# Patient Record
Sex: MALE | Race: BLACK OR AFRICAN AMERICAN | NOT HISPANIC OR LATINO | Employment: FULL TIME | URBAN - METROPOLITAN AREA
[De-identification: names, ages, dates, MRNs, and addresses within clinical notes are randomized per-mention and may not be internally consistent; named-entity substitution may affect disease eponyms.]

---

## 2018-07-28 ENCOUNTER — OFFICE VISIT (OUTPATIENT)
Dept: OCCUPATIONAL MEDICINE | Facility: CLINIC | Age: 48
End: 2018-07-28
Payer: OTHER MISCELLANEOUS

## 2018-07-28 VITALS — HEART RATE: 94 BPM | SYSTOLIC BLOOD PRESSURE: 135 MMHG | DIASTOLIC BLOOD PRESSURE: 83 MMHG

## 2018-07-28 DIAGNOSIS — S05.90XA EYE INJURY, INITIAL ENCOUNTER: ICD-10-CM

## 2018-07-28 DIAGNOSIS — S05.02XA LEFT CORNEAL ABRASION, INITIAL ENCOUNTER: Primary | ICD-10-CM

## 2018-07-28 LAB
CTP QC/QA: YES
POC 10 PANEL DRUG SCREEN: NEGATIVE

## 2018-07-28 PROCEDURE — 80305 DRUG TEST PRSMV DIR OPT OBS: CPT | Mod: QW,S$GLB,, | Performed by: FAMILY MEDICINE

## 2018-07-28 PROCEDURE — 99203 OFFICE O/P NEW LOW 30 MIN: CPT | Mod: S$GLB,,, | Performed by: FAMILY MEDICINE

## 2018-07-28 NOTE — PROGRESS NOTES
Subjective:       Patient ID: Obinna Lundy is a 47 y.o. male.    Chief Complaint: Work Related Injury and Eye Injury    Pt states that he was at work and got something in his left eye on Tuesday. Pt states that he has some discomfort but the redness is now gone. Pt states that he flushed his eye out and just wants to have it checked out           Eye Injury    The left eye is affected. This is a new problem. The current episode started in the past 7 days. The problem has been rapidly improving. The injury mechanism was a foreign body. The pain is at a severity of 2/10. The patient is experiencing no pain. There is no known exposure to pink eye. He does not wear contacts. Pertinent negatives include no blurred vision, double vision or eye redness. He has tried commercial eye wash for the symptoms.     Review of Systems   Eyes: Negative for blurred vision, double vision, pain and redness.   All other systems reviewed and are negative.      Objective:      Physical Exam   Constitutional: He is oriented to person, place, and time. He appears well-developed.   HENT:   Head: Normocephalic.   Nose: Nose normal.   Mouth/Throat: Oropharynx is clear and moist.   Eyes: EOM are normal. Pupils are equal, round, and reactive to light. Right eye exhibits no discharge and no exudate. Left eye exhibits no discharge and no exudate. Left conjunctiva is injected (mild).   Slit lamp exam:       The left eye shows corneal abrasion (mild, healing).       Cardiovascular: Normal rate and regular rhythm.    Pulmonary/Chest: Breath sounds normal.   Abdominal: Bowel sounds are normal.   Neurological: He is alert and oriented to person, place, and time.        Visual Acuity Screening    Right eye Left eye Both eyes   Without correction:      With correction: 20/50 20/50 20/50       Assessment:       1. Left corneal abrasion, initial encounter    2. Eye injury, initial encounter        Plan:            Patient Instructions:  (apply cool  compresses)   Restrictions: Regular Duty  Follow-up if symptoms worsen or fail to improve.  Patient Instructions     I recommend taking an over-the-counter nonsteroidal anti-inflammatory drug (NSAID) (i.e. Ibuprofen 200-400mg every 8 hours or Aleve 200-250mg every 12 hours) as needed relieving pain, helping with fever, and reducing inflammation.    Return here for any worsening or failure to improve.      Corneal Injury    An eye injury can hurt your cornea. Your cornea is the clear layer on the front of your eye. It protects your eye from dust and germs, and helps filter out harmful UV (ultraviolet) rays. The cornea also helps to focus light entering your eye. Your cornea is made of strong proteins, but it can be damaged. A slight cut or scratch (abrasion) to the cornea can be very painful. But that is often minor and can heal within 1 or 2 days. A bad abrasion or a hole (puncture) in the cornea can be very serious. These are medical emergencies.  Something in your eye  If you think you have something small in your eye, flush it with water right away. Pull your upper lid out and over your bottom lid. This will help increase the flow of tears across your eye. If these methods dont work, call your healthcare provider. Never try to remove an object from your eye that doesnt flush out easily with water. Doing so may cause more damage.  When to go to the emergency room (ER)  Call 911 or your local emergency number if you have:  · Severe eye pain  · A puncture injury or bad abrasion  · Something in your eye that you cant flush out with water  · A very swollen or painful eye after removing an object  · A chemical burn  · An object embedded in your eye. (Cover both eyes with a sterile compress and keep both eyes closed while you wait for help. DO NOT put any pressure on your eyes.)  What to expect in the ER  For minor abrasions   Minor abrasions are usually treated with eye drops or ointment. You may be given  antibiotics to prevent infection. Most abrasions heal in 1 or 2 days. To help rule out more serious injuries, you may have tests including:  · A standard eye exam to check how well you can see  · A Manohar test, which uses a special dye to look for severe eye damage  Depending on the results of these tests, you may be referred to an eye specialist (ophthalmologist).  For serious abrasions or punctures  You will be referred directly to an ophthalmologist for emergency treatment. An eye specialist is needed to reduce further damage and possible vision loss.  Date Last Reviewed: 8/12/2015  © 6465-6967 [employer]. [address] [place] [address], [place] [address]. All rights reserved. This information is not intended as a substitute for professional medical care. Always follow your healthcare professional's instructions.

## 2018-07-28 NOTE — LETTER
[facility] [address] [place] [address]  Phone: [phone]  Fax: [phone]    Pt Name: Obinna Lundy  Injury Date: 07/24/2018   Employee ID:  Date of First Treatment: 07/28/2018   Company: [employer] to record [id number]            Appointment Time: 11:00 AM Arrived: 11:15 AM CDT   Appointment Date: 07/28/2018 Time Out:12:15PM CDT   Physician: Efra Mireles MD        Office Treatment: Obinna was seen today for work related injury and eye injury.    Diagnoses and all orders for this visit:    Left corneal abrasion, initial encounter    Eye injury, initial encounter  -     POCT Rapid Drug Screen 10 Panel       Patient Instructions:  (apply cool compresses)    Restrictions: Regular Duty       Return Appointment: None

## 2018-07-28 NOTE — PATIENT INSTRUCTIONS
I recommend taking an over-the-counter nonsteroidal anti-inflammatory drug (NSAID) (i.e. Ibuprofen 200-400mg every 8 hours or Aleve 200-250mg every 12 hours) as needed relieving pain, helping with fever, and reducing inflammation.    Return here for any worsening or failure to improve.      Corneal Injury    An eye injury can hurt your cornea. Your cornea is the clear layer on the front of your eye. It protects your eye from dust and germs, and helps filter out harmful UV (ultraviolet) rays. The cornea also helps to focus light entering your eye. Your cornea is made of strong proteins, but it can be damaged. A slight cut or scratch (abrasion) to the cornea can be very painful. But that is often minor and can heal within 1 or 2 days. A bad abrasion or a hole (puncture) in the cornea can be very serious. These are medical emergencies.  Something in your eye  If you think you have something small in your eye, flush it with water right away. Pull your upper lid out and over your bottom lid. This will help increase the flow of tears across your eye. If these methods dont work, call your healthcare provider. Never try to remove an object from your eye that doesnt flush out easily with water. Doing so may cause more damage.  When to go to the emergency room (ER)  Call 911 or your local emergency number if you have:  · Severe eye pain  · A puncture injury or bad abrasion  · Something in your eye that you cant flush out with water  · A very swollen or painful eye after removing an object  · A chemical burn  · An object embedded in your eye. (Cover both eyes with a sterile compress and keep both eyes closed while you wait for help. DO NOT put any pressure on your eyes.)  What to expect in the ER  For minor abrasions   Minor abrasions are usually treated with eye drops or ointment. You may be given antibiotics to prevent infection. Most abrasions heal in 1 or 2 days. To help rule out more serious injuries, you may have  tests including:  · A standard eye exam to check how well you can see  · A Manohar test, which uses a special dye to look for severe eye damage  Depending on the results of these tests, you may be referred to an eye specialist (ophthalmologist).  For serious abrasions or punctures  You will be referred directly to an ophthalmologist for emergency treatment. An eye specialist is needed to reduce further damage and possible vision loss.  Date Last Reviewed: 8/12/2015 © 2000-2017 [employer]. [address] [place] [address]. All rights reserved. This information is not intended as a substitute for professional medical care. Always follow your healthcare professional's instructions.